# Patient Record
Sex: FEMALE | Race: WHITE
[De-identification: names, ages, dates, MRNs, and addresses within clinical notes are randomized per-mention and may not be internally consistent; named-entity substitution may affect disease eponyms.]

---

## 2021-07-11 ENCOUNTER — HOSPITAL ENCOUNTER (EMERGENCY)
Dept: HOSPITAL 7 - FB.ED | Age: 86
Discharge: NURSING, REHAB OR LONG-TERM CARE | End: 2021-07-11
Payer: MEDICARE

## 2021-07-11 VITALS — DIASTOLIC BLOOD PRESSURE: 69 MMHG | HEART RATE: 103 BPM | SYSTOLIC BLOOD PRESSURE: 136 MMHG

## 2021-07-11 DIAGNOSIS — R07.89: ICD-10-CM

## 2021-07-11 DIAGNOSIS — Z91.041: ICD-10-CM

## 2021-07-11 DIAGNOSIS — Z91.048: ICD-10-CM

## 2021-07-11 DIAGNOSIS — R07.2: Primary | ICD-10-CM

## 2021-07-11 DIAGNOSIS — R11.2: ICD-10-CM

## 2021-07-11 DIAGNOSIS — K44.9: ICD-10-CM

## 2021-07-11 DIAGNOSIS — Z88.3: ICD-10-CM

## 2021-07-11 DIAGNOSIS — Z79.899: ICD-10-CM

## 2021-07-11 DIAGNOSIS — Z91.013: ICD-10-CM

## 2021-07-11 DIAGNOSIS — E03.9: ICD-10-CM

## 2021-07-11 RX ADMIN — NITROGLYCERIN PRN MG: 0.4 TABLET SUBLINGUAL at 17:05

## 2021-07-11 RX ADMIN — NITROGLYCERIN PRN MG: 0.4 TABLET SUBLINGUAL at 18:20

## 2021-07-11 RX ADMIN — NITROGLYCERIN PRN MG: 0.4 TABLET SUBLINGUAL at 17:17

## 2021-07-11 NOTE — EDM.PDOC
ED HPI GENERAL MEDICAL PROBLEM





- General


Stated Complaint: ALLGERIC REACTION


Time Seen by Provider: 21 16:19


Source of Information: Reports: Patient


History Limitations: Reports: No Limitations





- History of Present Illness


INITIAL COMMENTS - FREE TEXT/NARRATIVE: 





85-year-old female who reports that at approximately 3:30 PM today she was 

eating a mushmelon and she had acute onset of substernal/central chest pain that

was a heavy pressure type pain seemed to radiate to her right chest and right 

upper quadrant and around to her back. It also radiated up into both shoulders 

and into her left neck. It was associated with nausea and vomiting and she had 

multiple episodes of vomiting with production of some pieces of the mushmelon. 

She also felt weak all over and was feeling somewhat short of breath. Really 

seem to make the pain better. The pain was worse with palpation in her upper 

abdomen. She presents to the emergency department via private vehicle with 

multiple family members. She had had no antecedent symptoms. She has been 

complaining of some upper back and left shoulder pain for quite some time and 

she has been seen for this and has been told that she has had bursitis. In 

addition, earlier today (between 12:30 and 1:30 PM), she and her  had a 

steak dinner and she tolerated that well without any problems. She drank coffee 

after this and was swallowing normally and was completely asymptomatic until 

approximately 3:30 PM as above. She is currently rating her pain as a 10/10. She

appears to be quite uncomfortable and she is having active emesis at present. No

fever. No chills. No antecedent symptoms as mentioned above. There are no other 

associated signs or symptoms. There are no other modifying factors.


Onset: Today (3:30 PM)


Duration: Constant


Location: Reports: Neck, Chest, Back


Quality: Reports: Pressure


Severity: Moderate (to severe)


Improves with: Reports: None


Worsens with: Reports: Other (Palpation)


Context: Reports: Other (The above.)


Associated Symptoms: Reports: No Other Symptoms (Except as above.)


Treatments PTA: Reports: Home Treatments (Baking soda with water. No relief.)


  ** Epigastric & Bi UQ Abdomen


Pain Score (Numeric/FACES): 7





- Related Data


                                    Allergies











Allergy/AdvReac Type Severity Reaction Status Date / Time


 


iodine Allergy Severe ANAPHYLAXIS Verified 21 16:31


 


niacin Allergy Severe Hives Verified 21 16:31


 


BARLEY GRASS Allergy Severe ANAPHYLAXIS Uncoded 04/28/15 09:29


 


OATMEAL Allergy Severe ANAPHYLAXIS Uncoded 04/28/15 09:29


 


TUNA OIL Allergy Severe ANAPHYLAXIS Uncoded 04/28/15 09:29











Home Meds: 


                                    Home Meds





Albuterol Sulfate [Albuterol Sulfate HFA] 1 puff IH Q4HR PRN 03/23/15 [History]


Albuterol/Ipratropium [DuoNeb 3.0-0.5 MG/3 ML] 3 ml INH BID PRN 03/23/15 

[History]


Ferrous Gluconate 324 mg PO DAILY 03/23/15 [History]


Levothyroxine [Synthroid] 88 mcg PO ACBREAKFAST 03/23/15 [History]


Mometasone/Formoterol [Dulera 200-5 MCG] 2 puff INH BIDRT 03/23/15 [History]


Montelukast Sodium [Singulair] 10 mg PO BEDTIME 03/23/15 [History]


Alendronate [Fosamax] 70 mg PO TU 16 [History]


Fexofenadine/Pseudoephedrine [Allegra-D 24 Hour Tablet] 1 tab PO DAILY 16 

[History]


LORazepam [Ativan] 1 mg PO BEDTIME PRN #15 tablet 16 [Rx]


predniSONE See Taper PO DAILY #20 tablet 16 [Rx]











Past Medical History


HEENT History: Reports: Cataract


Respiratory History: Reports: Asthma


Musculoskeletal History: Reports: Back Pain, Chronic


Endocrine/Metabolic History: Reports: Hypothyroidism





- Past Surgical History


HEENT Surgical History: Reports: Cataract Surgery


GI Surgical History: Reports: Cholecystectomy


Female  Surgical History: Reports:  Section





Social & Family History





- Tobacco Use


Tobacco Use Status *Q: Unknown Ever Used Tobacco (Nonsmoker.)





- Alcohol Use


Alcohol Use History: No





- Living Situation & Occupation


Living situation: Reports: 


Occupation: Retired





ED ROS GENERAL





- Review of Systems


Review Of Systems: See Below


Constitutional: Denies: Fever, Chills


HEENT: Denies: Throat Pain, Throat Swelling


Respiratory: Denies: Shortness of Breath, Cough


Cardiovascular: Reports: Chest Pain, Lightheadedness


GI/Abdominal: Reports: Abdominal Pain, Nausea, Other


: Denies: Frequency, Hematuria


Musculoskeletal: Reports: Neck Pain, Shoulder Pain, Back Pain


Skin: Denies: Diaphoresis, Rash


Neurological: Reports: Weakness.  Denies: Headache


Hematologic/Lymphatic: Denies: Easy Bleeding, Easy Bruising


Immunologic: Reports: Food Allergy, Environmental Allergy





ED EXAM, GENERAL





- Physical Exam


Exam: See Below


Exam Limited By: No Limitations


General Appearance: Alert, Moderate Distress, Obese


Eye Exam: Bilateral Eye: EOMI, Normal Inspection


Ears: Normal External Exam, Hearing Loss (Chronically hard of hearing)


Ear Exam: Bilateral Ear: Auricle Normal


Nose: Normal Inspection, Normal Mucosa, No Blood


Throat/Mouth: Normal Voice, No Airway Compromise


Head: Atraumatic, Normocephalic


Neck: Normal Inspection, Supple, Non-Tender, Full Range of Motion


Respiratory/Chest: No Respiratory Distress, Lungs Clear, Normal Breath Sounds, 

No Accessory Muscle Use, Chest Non-Tender


Cardiovascular: Normal Peripheral Pulses, No Edema, Tachycardia, Systolic Murmur

 (2-3/6)


Peripheral Pulses: 2+: Radial (L), Radial (R)


GI/Abdominal: Normal Bowel Sounds, Soft, No Mass, Tender (In epigastrium.).  No:

 Guarding, Rigid


Back Exam: Normal Inspection.  No: CVA Tenderness (R), CVA Tenderness (L)


Extremities: Normal Inspection, Normal Range of Motion, Non-Tender, No Pedal 

Edema, Normal Capillary Refill


Neurological: Alert, Oriented, CN II-XII Intact, Normal Cognition, No 

Motor/Sensory Deficits


Psychiatric: Normal Affect


Skin Exam: Warm, Dry, Intact, No Rash, Pallor.  No: Diaphoretic


  ** #1 Interpretation


EKG Date: 21


Time: 16:16


Rhythm: NSR (Sinus tachycardia)


Rate (Beats/Min): 105


Axis: Normal


P-Wave: Present


QRS: Other (LVH)


ST-T: Other (Mild, nonspecific ST elevation that could be early repolarization. 

No STEMI pattern.)


QT: Prolonged (Prolonged QTc.)


Comparison: NA - No Prior EKG





  ** #2 Interpretation


EKG Date: 21


Time: 16:46


Rhythm: NSR (Sinus tachycardia)


Rate (Beats/Min): 104


Axis: Normal


P-Wave: Present


QRS: Other (LVH)


ST-T: Other (Mild, nonspecific, diffuse ST segment elevation. No STEMI pattern.)


QT: Prolonged (Prolonged QTc.)


Comparison: No Change (No change from previous EKG today.)





Course





- Vital Signs


Last Recorded V/S: 


                                Last Vital Signs











Temp  35.6 C L  21 16:20


 


Pulse  103 H  21 19:20


 


Resp  17   21 19:20


 


BP  136/69   21 19:20


 


Pulse Ox  99   21 19:20














- Orders/Labs/Meds


Orders: 


                               Active Orders 24 hr











 Category Date Time Status


 


 EKG Documentation Completion [RC] ASDIRECTED Care  21 16:30 Active


 


 EKG Documentation Completion [RC] ASDIRECTED Care  21 16:52 Active


 


 Chest 1V Frontal [CR] Stat Exams  21 16:29 Taken


 


 Chest wo Cont [CT] Stat Exams  21 17:29 Taken


 


 Sodium Chloride 0.9% [Normal Saline] 1,000 ml Med  21 16:45 Active





 IV ASDIRECTED   


 


 Sodium Chloride 0.9% [Saline Flush] Med  21 16:29 Active





 10 ml FLUSH ASDIRECTED PRN   


 


 Peripheral IV Insertion Adult [OM.PC] Routine Oth  21 16:29 Ordered


 


 EKG 12 Lead [EK] Routine Ther  21 16:29 Ordered


 


 EKG 12 Lead [EK] Routine Ther  21 16:51 Ordered








                                Medication Orders





Sodium Chloride (Normal Saline)  1,000 mls @ 100 mls/hr IV ASDIRECTED JOSE L


   Last Admin: 21 18:21  Dose: 100 mls/hr


   Documented by: ILDA


Sodium Chloride (Sodium Chloride 0.9% 10 Ml Syringe)  10 ml FLUSH ASDIRECTED PRN


   PRN Reason: Keep Vein Open








Labs: 


                                Laboratory Tests











  21 Range/Units





  16:45 16:45 16:45 


 


WBC  12.5 H    (3.0-10.3)  x10-3/uL


 


RBC  4.98    (3.60-5.20)  x10(6)uL


 


Hgb  14.0    (11.4-15.5)  g/dL


 


Hct  43.0    (34.2-48.2)  %


 


MCV  86.3    (76.7-100.5)  fL


 


MCH  28.2    (23.9-33.9)  pg


 


MCHC  32.7    (31.9-34.8)  g/dL


 


RDW  13.7    (12.3-16.5)  %


 


Plt Count  287    (151-488)  x10(3)uL


 


MPV  8.9    (7.1-12.4)  fL


 


Neut % (Auto)  75.9    (30.8-76.2)  %


 


Lymph % (Auto)  15.3 L    (18.4-52.1)  %


 


Mono % (Auto)  5.0    (4.4-15.7)  %


 


Eos % (Auto)  3.2    (0.6-8.1)  %


 


Baso % (Auto)  0.6    (0.2-1.5)  %


 


Neut # (Auto)  9.5 H    (1.5-6.3)  x10-3/uL


 


Lymph # (Auto)  1.9    (1.0-4.4)  x10-3/uL


 


Mono # (Auto)  0.6    (0.3-1.0)  x10-3/uL


 


Eos # (Auto)  0.4    (0.0-0.8)  x10-3/uL


 


Baso # (Auto)  0.1    (0.0-0.1)  x10-3/uL


 


D-Dimer, Quantitative   0.87 H   (0.0-0.59)  mg/LFEU


 


Sodium    138  (135-145)  mmol/L


 


Potassium    3.7  (3.5-5.3)  mmol/L


 


Chloride    97 L  (100-110)  mmol/L


 


Carbon Dioxide    27  (21-32)  mmol/L


 


BUN    21 H  (7-18)  mg/dL


 


Creatinine    1.0  (0.55-1.02)  mg/dL


 


Est Cr Clr Drug Dosing    30.62  mL/min


 


Estimated GFR (MDRD)    53 L  (>60)  


 


BUN/Creatinine Ratio    21.0 H  (9-20)  


 


Glucose    207 H  ()  mg/dL


 


Calcium    9.7  (8.6-10.2)  mg/dL


 


Magnesium    1.8  (1.8-2.5)  mg/dL


 


Total Bilirubin    0.4  (0.1-1.3)  mg/dL


 


AST    17  (5-25)  IU/L


 


ALT    18  (12-36)  U/L


 


Alkaline Phosphatase    62  ()  IU/L


 


Troponin I     (4.0-60.3)  pg/mL


 


Total Protein    7.7  (6.0-8.0)  g/dL


 


Albumin    3.9  (3.2-4.6)  g/dL


 


Globulin    3.8  g/dL


 


Albumin/Globulin Ratio    1.0  


 


Lipase     ()  U/L














  21 Range/Units





  16:45 18:50 


 


WBC    (3.0-10.3)  x10-3/uL


 


RBC    (3.60-5.20)  x10(6)uL


 


Hgb    (11.4-15.5)  g/dL


 


Hct    (34.2-48.2)  %


 


MCV    (76.7-100.5)  fL


 


MCH    (23.9-33.9)  pg


 


MCHC    (31.9-34.8)  g/dL


 


RDW    (12.3-16.5)  %


 


Plt Count    (151-488)  x10(3)uL


 


MPV    (7.1-12.4)  fL


 


Neut % (Auto)    (30.8-76.2)  %


 


Lymph % (Auto)    (18.4-52.1)  %


 


Mono % (Auto)    (4.4-15.7)  %


 


Eos % (Auto)    (0.6-8.1)  %


 


Baso % (Auto)    (0.2-1.5)  %


 


Neut # (Auto)    (1.5-6.3)  x10-3/uL


 


Lymph # (Auto)    (1.0-4.4)  x10-3/uL


 


Mono # (Auto)    (0.3-1.0)  x10-3/uL


 


Eos # (Auto)    (0.0-0.8)  x10-3/uL


 


Baso # (Auto)    (0.0-0.1)  x10-3/uL


 


D-Dimer, Quantitative    (0.0-0.59)  mg/LFEU


 


Sodium    (135-145)  mmol/L


 


Potassium    (3.5-5.3)  mmol/L


 


Chloride    (100-110)  mmol/L


 


Carbon Dioxide    (21-32)  mmol/L


 


BUN    (7-18)  mg/dL


 


Creatinine    (0.55-1.02)  mg/dL


 


Est Cr Clr Drug Dosing    mL/min


 


Estimated GFR (MDRD)    (>60)  


 


BUN/Creatinine Ratio    (9-20)  


 


Glucose    ()  mg/dL


 


Calcium    (8.6-10.2)  mg/dL


 


Magnesium    (1.8-2.5)  mg/dL


 


Total Bilirubin    (0.1-1.3)  mg/dL


 


AST    (5-25)  IU/L


 


ALT    (12-36)  U/L


 


Alkaline Phosphatase    ()  IU/L


 


Troponin I  6.2  8.0  (4.0-60.3)  pg/mL


 


Total Protein    (6.0-8.0)  g/dL


 


Albumin    (3.2-4.6)  g/dL


 


Globulin    g/dL


 


Albumin/Globulin Ratio    


 


Lipase  67 L   ()  U/L











Meds: 


Medications











Generic Name Dose Route Start Last Admin





  Trade Name Freq  PRN Reason Stop Dose Admin


 


Sodium Chloride  1,000 mls @ 100 mls/hr  21 16:45  21 18:21





  Normal Saline  IV   100 mls/hr





  ASDIRECTED JOSE L   Administration


 


Sodium Chloride  10 ml  21 16:29 





  Sodium Chloride 0.9% 10 Ml Syringe  FLUSH  





  ASDIRECTED PRN  





  Keep Vein Open  














Discontinued Medications














Generic Name Dose Route Start Last Admin





  Trade Name Freq  PRN Reason Stop Dose Admin


 


Aspirin  324 mg  21 18:50 





  Aspirin 81 Mg Tab.Chew  PO  21 18:51 





  ONETIME ONE  


 


Sodium Chloride  500 mls @ 999 mls/hr  21 16:31  21 16:47





  Normal Saline  IV  21 17:01  999 mls/hr





  .BOLUS ONE   Administration


 


Metoclopramide HCl  10 mg  21 18:41 





  Metoclopramide 10 Mg/2 Ml Sdv  IVPUSH  21 18:42 





  ONETIME ONE  


 


Nitroglycerin  0.4 mg  21 16:48  21 18:20





  Nitroglycerin 0.4 Mg Tab.Sl  SL   0.4 mg





  Q5M PRN   Administration





  Chest Pain  


 


Ondansetron HCl  4 mg  21 16:31  21 16:47





  Ondansetron 4 Mg/2 Ml Sdv  IVPUSH  21 16:32  4 mg





  ONETIME ONE   Administration














- Radiology Interpretation


Free Text/Narrative:: 





Portable chest x-ray shows no acute abnormality per my read.





CT of the chest showed a large hiatal hernia with mild bronchiectasis in the 

right upper lobe. There is a compression fracture of L1 vertebral body that is 

felt to be chronic. This is per the [facility] radiologist.





- Re-Assessments/Exams


Free Text/Narrative Re-Assessment/Exam: 





21 17:05: The initial EKG showed no STEMI pattern. There were some no

nspecific ST-T changes. A repeat EKG showed no STEMI pattern and no change from 

previous. Her initial troponin was normal. She is still having the pain in her 

central chest and she has pain across both of her shoulder area and upper back. 

She also is having some left-sided neck pain. It is improved but not gone. The 

D-dimer is slightly elevated. My concerns are that this still represents an 

acute coronary syndrome. It also could represent an aortic dissection. She was 

eating steak prior to this but had no problems while she was eating the steak 

and actually was able to drink coffee and swallow normally lowing eating the 

steak. Her symptoms did not begin until when she got home and she was eating the

 cantalope. She feels improved after the 2 sublingual nitroglycerin. I am 

awaiting the portable chest x-ray.





21 17:25: The portable chest x-ray showed no acute disease. Her symptoms 

are still present but improved. She is allergic to iodine and contrast dye. I 

will do a CT scan of the patient's chest without contrast to further assess the 

patient. I will continue close monitoring of the patient.





21 18:40: The CT scan of the chest without contrast shows a large hiatal 

hernia. There is also mild bronchiectasis in the right upper lobe and a chronic 

L1 compression fracture. She is still having pain in her central chest and it 

did improve with the last/third sublingual nitroglycerin. She is rating it as a 

6/10 now. She appears to be much more comfortable than she did she arrived. I am

 still concerned about an acute coronary syndrome and I am repeating her 

troponin now. She does not appear to have an acute esophageal food impaction. 

The aorta is not dilated and there are no perivascular fluid collections. I am 

awaiting the results of the repeat troponin before making final decision but I 

do feel that she will need admission and there are no beds available at [facility]. I am also concerned that she will need specially cares which

 are not available at [facility] as well. I discussed all this with 

the patient and with her daughter. I will give the patient Reglan 10 mg IV now. 

I will also give the patient aspirin 324 mg by mouth.





21 19:25: The patient's repeat troponin was normal. The patient's pain is 

down to 4/10 and she is much more comfortable. I am still concerned about a 

possible acute coronary syndrome and it is still unclear why she is having the 

chest pain. I feel that she will need admission and there are no beds available 

at [facility]. I had previously discussed this with the patient and 

with her daughter who is the medical decision maker and they would prefer that I

 discuss the patient's case with the doctors at [facility] in [place] if transfer 

would be necessary. Therefore I will call and discuss the patient's case with 

the doctors at [facility] in [place].





21 20:05: I discussed the patient's case with Dr. Florian, hospitalist at 

[facility] in [place], and she has agreed to accept the patient in transfer. The 

patient will need to be transferred via ALS ambulance service to [facility] for direct admission. I have discussed this with the patient and with the 

patient's daughter and they are in agreement with this plan. We will continue 

close monitoring of the patient.








Departure





- Departure


Time of Disposition: 20:15


Disposition: DC/Tfer to Acute Hospital 02


Condition: Fair (Guarded)


Clinical Impression: 


 Hiatal hernia





Chest pain


Qualifiers:


 Chest pain type: unspecified Qualified Code(s): R07.9 - Chest pain, unspecified





Vomiting


Qualifiers:


 Vomiting type: unspecified Vomiting Intractability: non-intractable Nausea 

presence: with nausea Qualified Code(s): R11.2 - Nausea with vomiting, 

unspecified








- Discharge Information


Referrals: 


Cristhian Galeano MD [Primary Care Provider] - 





Sepsis Event Note (ED)





- Focused Exam


Vital Signs: 


                                   Vital Signs











  Temp Pulse Resp BP BP Pulse Ox


 


 21 19:20   103 H  17   136/69  99


 


 21 18:31   109 H  17   127/67  97


 


 21 18:20     137/78  


 


 21 17:44   103 H  14   142/81 H  98


 


 21 17:29   107 H  20   126/73  98


 


 21 17:17     101/63  


 


 21 17:05     149/78 H  


 


 21 16:20  35.6 C L  109 H  16   157/113 H  93 L














- My Orders


Last 24 Hours: 


My Active Orders





21 16:29


Chest 1V Frontal [CR] Stat 


Sodium Chloride 0.9% [Saline Flush]   10 ml FLUSH ASDIRECTED PRN 


Peripheral IV Insertion Adult [OM.PC] Routine 


EKG 12 Lead [EK] Routine 





21 16:30


EKG Documentation Completion [RC] ASDIRECTED 





21 16:45


Sodium Chloride 0.9% [Normal Saline] 1,000 ml IV ASDIRECTED 





21 16:51


EKG 12 Lead [EK] Routine 





21 16:52


EKG Documentation Completion [RC] ASDIRECTED 





21 17:29


Chest wo Cont [CT] Stat 














- Assessment/Plan


Last 24 Hours: 


My Active Orders





21 16:29


Chest 1V Frontal [CR] Stat 


Sodium Chloride 0.9% [Saline Flush]   10 ml FLUSH ASDIRECTED PRN 


Peripheral IV Insertion Adult [OM.PC] Routine 


EKG 12 Lead [EK] Routine 





21 16:30


EKG Documentation Completion [RC] ASDIRECTED 





21 16:45


Sodium Chloride 0.9% [Normal Saline] 1,000 ml IV ASDIRECTED 





21 16:51


EKG 12 Lead [EK] Routine 





21 16:52


EKG Documentation Completion [RC] ASDIRECTED 





21 17:29


Chest wo Cont [CT] Stat

## 2021-07-12 NOTE — CR
INDICATION:  Epigastric pain.



CHEST ONE VIEW:  An AP upright portable view of the chest was obtained 07/11/21 
and revealed a rather large fixed hiatal hernia with air-fluid level.  Heart 
size is difficult to evaluate but may be somewhat enlarged.  



The aorta is tortuous with calcification in the arch.  Overlying EKG leads are 
noted.  Previous rib fracture.  Previous rib fracture which appears healed on 
the right posterolaterally noted.  A definite active infiltrate or effusion was 
not identified.



IMPRESSION:  

1.  Large fixed hiatal hernia with air-fluid level.  

2.  ASD aorta.

MTDD